# Patient Record
Sex: FEMALE | Race: BLACK OR AFRICAN AMERICAN | NOT HISPANIC OR LATINO | URBAN - METROPOLITAN AREA
[De-identification: names, ages, dates, MRNs, and addresses within clinical notes are randomized per-mention and may not be internally consistent; named-entity substitution may affect disease eponyms.]

---

## 2019-06-18 ENCOUNTER — OFFICE VISIT (OUTPATIENT)
Dept: FAMILY MEDICINE | Facility: CLINIC | Age: 59
End: 2019-06-18
Payer: COMMERCIAL

## 2019-06-18 VITALS
WEIGHT: 208 LBS | DIASTOLIC BLOOD PRESSURE: 76 MMHG | SYSTOLIC BLOOD PRESSURE: 112 MMHG | RESPIRATION RATE: 18 BRPM | BODY MASS INDEX: 36.86 KG/M2 | HEART RATE: 80 BPM | HEIGHT: 63 IN

## 2019-06-18 DIAGNOSIS — G47.00 INSOMNIA, UNSPECIFIED TYPE: Primary | ICD-10-CM

## 2019-06-18 DIAGNOSIS — M77.8 RIGHT SHOULDER TENDONITIS: ICD-10-CM

## 2019-06-18 DIAGNOSIS — Z51.81 MEDICATION MONITORING ENCOUNTER: ICD-10-CM

## 2019-06-18 DIAGNOSIS — Z98.84 BARIATRIC SURGERY STATUS: ICD-10-CM

## 2019-06-18 DIAGNOSIS — R29.898 RIGHT ARM WEAKNESS: ICD-10-CM

## 2019-06-18 DIAGNOSIS — Z12.31 VISIT FOR SCREENING MAMMOGRAM: ICD-10-CM

## 2019-06-18 DIAGNOSIS — E61.1 IRON DEFICIENCY: ICD-10-CM

## 2019-06-18 DIAGNOSIS — E53.8 B12 DEFICIENCY: ICD-10-CM

## 2019-06-18 PROCEDURE — 99999 PR PBB SHADOW E&M-NEW PATIENT-LVL IV: ICD-10-PCS | Mod: PBBFAC,,, | Performed by: FAMILY MEDICINE

## 2019-06-18 PROCEDURE — 99999 PR PBB SHADOW E&M-NEW PATIENT-LVL IV: CPT | Mod: PBBFAC,,, | Performed by: FAMILY MEDICINE

## 2019-06-18 PROCEDURE — 99204 OFFICE O/P NEW MOD 45 MIN: CPT | Mod: S$GLB,,, | Performed by: FAMILY MEDICINE

## 2019-06-18 PROCEDURE — 3008F BODY MASS INDEX DOCD: CPT | Mod: CPTII,S$GLB,, | Performed by: FAMILY MEDICINE

## 2019-06-18 PROCEDURE — 3008F PR BODY MASS INDEX (BMI) DOCUMENTED: ICD-10-PCS | Mod: CPTII,S$GLB,, | Performed by: FAMILY MEDICINE

## 2019-06-18 PROCEDURE — 99204 PR OFFICE/OUTPT VISIT, NEW, LEVL IV, 45-59 MIN: ICD-10-PCS | Mod: S$GLB,,, | Performed by: FAMILY MEDICINE

## 2019-06-18 RX ORDER — PANTOPRAZOLE SODIUM 40 MG/1
40 TABLET, DELAYED RELEASE ORAL DAILY
COMMUNITY
End: 2019-06-19 | Stop reason: SDUPTHER

## 2019-06-18 RX ORDER — KETOCONAZOLE 20 MG/G
CREAM TOPICAL DAILY
COMMUNITY
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
## 2019-06-19 RX ORDER — PANTOPRAZOLE SODIUM 40 MG/1
40 TABLET, DELAYED RELEASE ORAL DAILY
Qty: 30 TABLET | Refills: 5 | Status: SHIPPED | OUTPATIENT
Start: 2019-06-19 | End: 2019-06-20

## 2019-06-20 ENCOUNTER — TELEPHONE (OUTPATIENT)
Dept: FAMILY MEDICINE | Facility: CLINIC | Age: 59
End: 2019-06-20

## 2019-06-20 RX ORDER — PANTOPRAZOLE SODIUM 40 MG/1
40 TABLET, DELAYED RELEASE ORAL 2 TIMES DAILY
Qty: 60 TABLET | Refills: 5 | Status: SHIPPED | OUTPATIENT
Start: 2019-06-20

## 2019-06-20 NOTE — TELEPHONE ENCOUNTER
Called for patient, to follow up on referral to physical therapy. Left message for her to call me back.

## 2019-06-20 NOTE — TELEPHONE ENCOUNTER
----- Message from Judi Dykes sent at 2019 12:36 PM CDT -----  Contact: Self  Smita Matias  MRN: [id number]  : 1960  PCP: Deisy Shelton  Home Phone      [phone]  Work Phone      Not on file.  Mobile          [phone]      MESSAGE:   Patient called regarding pantoprazole (PROTONIX) 40 MG tablet. She takes 2 a day, but only 30 pills were called in. Please advise.    [facility] [place] [phone]

## 2019-06-20 NOTE — TELEPHONE ENCOUNTER
Spoke w/ patient, reports she takes Protonix BID instead of QD as reported. Can we re-route this? Thanks

## 2019-06-21 DIAGNOSIS — Z12.11 COLON CANCER SCREENING: ICD-10-CM

## 2019-06-23 ENCOUNTER — LAB VISIT (OUTPATIENT)
Dept: LAB | Facility: HOSPITAL | Age: 59
End: 2019-06-23
Attending: FAMILY MEDICINE
Payer: COMMERCIAL

## 2019-06-23 DIAGNOSIS — E61.1 IRON DEFICIENCY: ICD-10-CM

## 2019-06-23 DIAGNOSIS — E53.8 B12 DEFICIENCY: ICD-10-CM

## 2019-06-23 DIAGNOSIS — Z51.81 MEDICATION MONITORING ENCOUNTER: ICD-10-CM

## 2019-06-23 DIAGNOSIS — Z98.84 BARIATRIC SURGERY STATUS: ICD-10-CM

## 2019-06-23 LAB
ALBUMIN SERPL BCP-MCNC: 3.6 G/DL (ref 3.5–5.2)
ALP SERPL-CCNC: 96 U/L (ref 55–135)
ALT SERPL W/O P-5'-P-CCNC: 12 U/L (ref 10–44)
ANION GAP SERPL CALC-SCNC: 9 MMOL/L (ref 8–16)
AST SERPL-CCNC: 14 U/L (ref 10–40)
BASOPHILS # BLD AUTO: 0.08 K/UL (ref 0–0.2)
BASOPHILS NFR BLD: 1.2 % (ref 0–1.9)
BILIRUB SERPL-MCNC: 0.3 MG/DL (ref 0.1–1)
BUN SERPL-MCNC: 13 MG/DL (ref 6–20)
CALCIUM SERPL-MCNC: 9.5 MG/DL (ref 8.7–10.5)
CHLORIDE SERPL-SCNC: 106 MMOL/L (ref 95–110)
CHOLEST SERPL-MCNC: 172 MG/DL (ref 120–199)
CHOLEST/HDLC SERPL: 2.5 {RATIO} (ref 2–5)
CO2 SERPL-SCNC: 27 MMOL/L (ref 23–29)
CREAT SERPL-MCNC: 0.8 MG/DL (ref 0.5–1.4)
DIFFERENTIAL METHOD: ABNORMAL
EOSINOPHIL # BLD AUTO: 0.2 K/UL (ref 0–0.5)
EOSINOPHIL NFR BLD: 2.2 % (ref 0–8)
ERYTHROCYTE [DISTWIDTH] IN BLOOD BY AUTOMATED COUNT: 16.1 % (ref 11.5–14.5)
EST. GFR  (AFRICAN AMERICAN): >60 ML/MIN/1.73 M^2
EST. GFR  (NON AFRICAN AMERICAN): >60 ML/MIN/1.73 M^2
FERRITIN SERPL-MCNC: 77 NG/ML (ref 20–300)
GLUCOSE SERPL-MCNC: 96 MG/DL (ref 70–110)
HCT VFR BLD AUTO: 38 % (ref 37–48.5)
HDLC SERPL-MCNC: 69 MG/DL (ref 40–75)
HDLC SERPL: 40.1 % (ref 20–50)
HGB BLD-MCNC: 11.8 G/DL (ref 12–16)
IRON SERPL-MCNC: 69 UG/DL (ref 30–160)
LDLC SERPL CALC-MCNC: 91.6 MG/DL (ref 63–159)
LYMPHOCYTES # BLD AUTO: 3.1 K/UL (ref 1–4.8)
LYMPHOCYTES NFR BLD: 46.4 % (ref 18–48)
MCH RBC QN AUTO: 28 PG (ref 27–31)
MCHC RBC AUTO-ENTMCNC: 31.1 G/DL (ref 32–36)
MCV RBC AUTO: 90 FL (ref 82–98)
MONOCYTES # BLD AUTO: 0.5 K/UL (ref 0.3–1)
MONOCYTES NFR BLD: 7.4 % (ref 4–15)
NEUTROPHILS # BLD AUTO: 2.9 K/UL (ref 1.8–7.7)
NEUTROPHILS NFR BLD: 42.8 % (ref 38–73)
NONHDLC SERPL-MCNC: 103 MG/DL
PLATELET # BLD AUTO: 207 K/UL (ref 150–350)
PMV BLD AUTO: 10.6 FL (ref 9.2–12.9)
POTASSIUM SERPL-SCNC: 4.1 MMOL/L (ref 3.5–5.1)
PROT SERPL-MCNC: 6.9 G/DL (ref 6–8.4)
RBC # BLD AUTO: 4.22 M/UL (ref 4–5.4)
SATURATED IRON: 20 % (ref 20–50)
SODIUM SERPL-SCNC: 142 MMOL/L (ref 136–145)
TOTAL IRON BINDING CAPACITY: 352 UG/DL (ref 250–450)
TRANSFERRIN SERPL-MCNC: 238 MG/DL (ref 200–375)
TRIGL SERPL-MCNC: 57 MG/DL (ref 30–150)
VIT B12 SERPL-MCNC: 417 PG/ML (ref 210–950)
WBC # BLD AUTO: 6.73 K/UL (ref 3.9–12.7)

## 2019-06-23 PROCEDURE — 82607 VITAMIN B-12: CPT

## 2019-06-23 PROCEDURE — 82728 ASSAY OF FERRITIN: CPT

## 2019-06-23 PROCEDURE — 85025 COMPLETE CBC W/AUTO DIFF WBC: CPT

## 2019-06-23 PROCEDURE — 80061 LIPID PANEL: CPT

## 2019-06-23 PROCEDURE — 80053 COMPREHEN METABOLIC PANEL: CPT

## 2019-06-23 PROCEDURE — 83540 ASSAY OF IRON: CPT

## 2019-06-23 PROCEDURE — 36415 COLL VENOUS BLD VENIPUNCTURE: CPT

## 2019-06-26 ENCOUNTER — CLINICAL SUPPORT (OUTPATIENT)
Dept: REHABILITATION | Facility: HOSPITAL | Age: 59
End: 2019-06-26
Attending: FAMILY MEDICINE
Payer: COMMERCIAL

## 2019-06-26 DIAGNOSIS — M79.601 PAIN OF RIGHT UPPER EXTREMITY: ICD-10-CM

## 2019-06-26 DIAGNOSIS — M25.60 STIFFNESS IN JOINT: ICD-10-CM

## 2019-06-26 DIAGNOSIS — R53.1 WEAKNESS: ICD-10-CM

## 2019-06-26 PROCEDURE — 97165 OT EVAL LOW COMPLEX 30 MIN: CPT | Mod: PN

## 2019-06-26 PROCEDURE — 97110 THERAPEUTIC EXERCISES: CPT | Mod: PN

## 2019-06-26 NOTE — PATIENT INSTRUCTIONS
Flexibility: Upper Trapezius Stretch        Gently grasp right side of head while reaching behind back with other hand. Tilt head away until a gentle stretch is felt. Hold ____ seconds.  Repeat ____ times per set. Do ____ sets per session. Do ____ sessions per day.     https://Contextool.[employer].us/341     Copyright © [employer]. All rights reserved.

## 2019-06-27 PROBLEM — R53.1 WEAKNESS: Status: ACTIVE | Noted: 2019-06-27

## 2019-06-27 PROBLEM — M79.601 PAIN OF RIGHT UPPER EXTREMITY: Status: ACTIVE | Noted: 2019-06-27

## 2019-06-27 PROBLEM — M25.60 STIFFNESS IN JOINT: Status: ACTIVE | Noted: 2019-06-27
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
## 2019-06-28 ENCOUNTER — TELEPHONE (OUTPATIENT)
Dept: FAMILY MEDICINE | Facility: CLINIC | Age: 59
End: 2019-06-28

## 2019-07-02 RX ORDER — ESZOPICLONE 1 MG/1
1 TABLET, FILM COATED ORAL NIGHTLY
Qty: 30 TABLET | Refills: 0 | Status: SHIPPED | OUTPATIENT
Start: 2019-07-02 | End: 2019-08-01

## 2019-07-15 ENCOUNTER — OFFICE VISIT (OUTPATIENT)
Dept: FAMILY MEDICINE | Facility: CLINIC | Age: 59
End: 2019-07-15
Payer: COMMERCIAL

## 2019-07-15 ENCOUNTER — APPOINTMENT (OUTPATIENT)
Dept: RADIOLOGY | Facility: CLINIC | Age: 59
End: 2019-07-15
Attending: FAMILY MEDICINE
Payer: COMMERCIAL

## 2019-07-15 VITALS
HEART RATE: 84 BPM | BODY MASS INDEX: 37.21 KG/M2 | RESPIRATION RATE: 18 BRPM | DIASTOLIC BLOOD PRESSURE: 74 MMHG | SYSTOLIC BLOOD PRESSURE: 110 MMHG | WEIGHT: 210 LBS | HEIGHT: 63 IN

## 2019-07-15 DIAGNOSIS — M79.671 RIGHT FOOT PAIN: Primary | ICD-10-CM

## 2019-07-15 DIAGNOSIS — M79.671 RIGHT FOOT PAIN: ICD-10-CM

## 2019-07-15 PROCEDURE — 99213 PR OFFICE/OUTPT VISIT, EST, LEVL III, 20-29 MIN: ICD-10-PCS | Mod: S$GLB,,, | Performed by: FAMILY MEDICINE

## 2019-07-15 PROCEDURE — 3008F BODY MASS INDEX DOCD: CPT | Mod: CPTII,S$GLB,, | Performed by: FAMILY MEDICINE

## 2019-07-15 PROCEDURE — 73630 X-RAY EXAM OF FOOT: CPT | Mod: TC,PO,RT

## 2019-07-15 PROCEDURE — 73630 X-RAY EXAM OF FOOT: CPT | Mod: 26,RT,, | Performed by: RADIOLOGY

## 2019-07-15 PROCEDURE — 3008F PR BODY MASS INDEX (BMI) DOCUMENTED: ICD-10-PCS | Mod: CPTII,S$GLB,, | Performed by: FAMILY MEDICINE

## 2019-07-15 PROCEDURE — 99999 PR PBB SHADOW E&M-EST. PATIENT-LVL III: CPT | Mod: PBBFAC,,, | Performed by: FAMILY MEDICINE

## 2019-07-15 PROCEDURE — 99213 OFFICE O/P EST LOW 20 MIN: CPT | Mod: S$GLB,,, | Performed by: FAMILY MEDICINE

## 2019-07-15 PROCEDURE — 99999 PR PBB SHADOW E&M-EST. PATIENT-LVL III: ICD-10-PCS | Mod: PBBFAC,,, | Performed by: FAMILY MEDICINE

## 2019-07-15 PROCEDURE — 73630 XR FOOT COMPLETE 3 VIEW RIGHT: ICD-10-PCS | Mod: 26,RT,, | Performed by: RADIOLOGY

## 2019-07-16 NOTE — PROGRESS NOTES
Subjective:       Patient ID: Smita Matias is a 59 y.o. female.    Chief Complaint: broken toe (right foot 4th toe)    Pt is a 59 y.o. female who presents for evaluation and management of   Encounter Diagnosis   Name Primary?    Right foot pain Yes   .Her 90# dog stepped on her foot     Doing well on current meds. Denies any side effects. Prevention is up to date.  Review of Systems   Musculoskeletal: Positive for gait problem and joint swelling.       Objective:      Physical Exam   Constitutional: She is oriented to person, place, and time. She appears well-developed and well-nourished.   HENT:   Head: Normocephalic and atraumatic.   Right Ear: External ear normal.   Left Ear: External ear normal.   Nose: Nose normal.   Eyes: Pupils are equal, round, and reactive to light. EOM are normal.   Neck: Normal range of motion. Neck supple. No JVD present. No tracheal deviation present. No thyromegaly present.   Cardiovascular: Normal rate.   Pulmonary/Chest: Effort normal. No respiratory distress.   Abdominal: Soft.   Musculoskeletal: Normal range of motion. She exhibits tenderness. She exhibits no edema.   TTP at MT and MTP joint 4th toe   Lymphadenopathy:     She has no cervical adenopathy.   Neurological: She is alert and oriented to person, place, and time.   Skin: Skin is warm and dry. No rash noted. No erythema. No pallor.   Psychiatric: She has a normal mood and affect. Her behavior is normal. Judgment and thought content normal.       Assessment:       1. Right foot pain        Plan:   Smita was seen today for broken toe.    Diagnoses and all orders for this visit:    Right foot pain  -     X-Ray Foot Complete Right; Future      Problem List Items Addressed This Visit     None      Visit Diagnoses     Right foot pain    -  Primary    Relevant Orders    X-Ray Foot Complete Right (Completed)      Xray negative for fracture   Jese tape     RTC if condition acutely worsens or any other concerns, otherwise RTC  as scheduled    No follow-ups on file.

## 2019-07-18 ENCOUNTER — TELEPHONE (OUTPATIENT)
Dept: FAMILY MEDICINE | Facility: CLINIC | Age: 59
End: 2019-07-18

## 2019-07-18 DIAGNOSIS — M79.601 PAIN OF RIGHT UPPER EXTREMITY: Primary | ICD-10-CM

## 2019-07-18 NOTE — TELEPHONE ENCOUNTER
----- Message from Bettie Wilson sent at 2019  8:21 AM CDT -----  Contact: self  Smita Matias  MRN: [id number]  : 1960  PCP: Deisy Shelton  Home Phone      [phone]  Work Phone      Not on file.  Mobile          [phone]      MESSAGE:   Patient would like to get a call back from nurse to discuss a referral she thinks she needs.    [phone]

## 2019-07-24 ENCOUNTER — CLINICAL SUPPORT (OUTPATIENT)
Dept: REHABILITATION | Facility: HOSPITAL | Age: 59
End: 2019-07-24
Attending: FAMILY MEDICINE
Payer: COMMERCIAL

## 2019-07-24 DIAGNOSIS — R53.1 WEAKNESS: ICD-10-CM

## 2019-07-24 DIAGNOSIS — M25.60 STIFFNESS IN JOINT: ICD-10-CM

## 2019-07-24 DIAGNOSIS — M79.601 PAIN OF RIGHT UPPER EXTREMITY: ICD-10-CM

## 2019-07-24 PROCEDURE — 97110 THERAPEUTIC EXERCISES: CPT | Mod: PN

## 2019-07-24 PROCEDURE — 97140 MANUAL THERAPY 1/> REGIONS: CPT | Mod: PN

## 2019-07-24 NOTE — PROGRESS NOTES
"  Occupational Therapy Daily Treatment Note     Date: 7/24/2019  Name: Smita Matias  Clinic Number: [id number]    Therapy Diagnosis:   Encounter Diagnoses   Name Primary?    Pain of right upper extremity     Weakness     Stiffness in joint      Physician: Deisy Shelton MD    Physician Orders: OT evaluate and treat  Medical Diagnosis:   R29.898 (ICD-10-CM) - Right arm weakness   M75.81 (ICD-10-CM) - Right shoulder tendonitis      Evaluation Date: 6/26/2019  Insurance Authorization period Expiration: 12/31/2019  Plan of Care Expiration Period: 8/23/2019  Next MD appointment:None scheduled     Visit # / Visits Authorized: 2/ 50  Time In:510  Time Out:600  Total Billable Time: 50 minutes  MT1 TE3     Precautions: Standard     Subjective     Pt reports: "I still have the popping in my shoulder but I think it's moving better, I go see the hand doctor on Tuesday though."  she was compliant with home exercise program given 6/26/2019  Response to previous treatment:Increased ROM     Functional change: Increased pain in wrist, unable to perform band exercises because of wrist pain.     Pain: 5/10  Location: right shoulder, wrist    Objective     Smita received the following manual therapy techniques for 10 minutes:   -consisting of patient supine for Right shoulder lateral telescoping, upper trapezius soft tissue mobilization, pectoralis lift, subscapularis myofascial release and stretch, PROM with endrange stretching, glenohumeral joint inferior anterior posterior glides grade I-III, gentle shoulder oscillations    Smita received therapeutic exercises for 40 minutes including:  Exercises        PROM (Right) Shoulder Flexion/Abduction/Internal rotation/External Rotation 10x   Supine pectoralis stretch 3/30"   Supine dowel Flexion 0  2/10   Sidelying Abduction 1#  2/10   Sidelying External Rotation 1#  2/10   Sidelying Gator Not today secondary to time   pulleys 2'   Wall slides towel  2/10   Corner pectoralis " "stretch 3/30"   Theraband Lats Green  2/10   Theraband Rows Green  2/15   Theraband Internal Rotation/External Rotation Red  2/15     Range of Motion/Strength:   Shoulder   Right     AROM PROM   Flexion 145(+10) 140(+5)   Extension 55(+15) WNL   Abduction 145(+10) 145(+35)   HorizAdduction 35(+5) WNL   Internal rotation L4(=) 80(+20)   ER at 90° abd 90(+5) 90(=)   ER at 0° abd 90(+10) 90(=)   NT=Not tested    Home Exercises and Education Provided     Education provided:   - Progress towards goals     Written Home Exercises Provided: Patient instructed to cont prior HEP.  Exercises were reviewed and Smita was able to demonstrate them prior to the end of the session.  Smita demonstrated good  understanding of the HEP provided.   .   See EMR under Patient Instructions for exercises provided 6/26/2019       Assessment   Pt participated well today. She has not been to therapy since evaluation due to work conflicts or weather. Despite that she reports compliance with HEP. She demonstrates overall improved ROM in Right shoulder since initial evaluation. She continues to have pain and popping however it's not as frequent or as intense. Pt states she has an appointment with a hand specialist on Tuesday regarding her wrist. She was unable to perfomd band exercises today secondary to wrist pain. She had significant tenderness noted in biceps durng manual therapy however improved after. Pt very motivated.   Smita is progressing well towards her goals and there are no updates to goals at this time. Pt prognosis is Good.     Pt will continue to benefit from skilled outpatient occupational therapy to address the deficits listed in the problem list on initial evaluation provide pt/family education and to maximize pt's level of independence in the home and community environment.     Anticipated barriers to occupational therapy: none    Pt's spiritual, cultural and educational needs considered and pt agreeable to plan of care and " goals.    Goals:  Short Term Goals to be met in 4 weeks: (7/26/2019)  1) Initiate Hep-met, onging  2) Pt will increase Right shoulder AROM by 10 degrees grossly for improved performance with overhead ADL's-met  3) Pt will report 3/10 pain in (Right)shoulder at worst -Not met, progressing  4) Pt will demonstrate increased MMT to 4/5 grossly Right shoulder -Not met, progressing  5) Patient will be able to achieve less than or equal to 35% on FOTO shoulder survey demonstrating overall improved functional ability with upper extremity. -Not met, progressing  6) Patient will increase Right  by 3# for improved performance with functional grasp -Not met, progressing     Long Term Goals to be met by discharge:  1) Independent with HEP -Not met, progressing  2) Pt will demonstrate (Right) shoulder AROM WNL grossly for [place] with ADL's -Not met, progressing  3) Pt will demonstrate (Right) shoulder MMT WNL grossly for [place] with functional activities -Not met, progressing  4) Independent and pain free with ADL's and IADL's -Not met, progressing  5) Patient will be able to achieve less than or equal to 15% on FOTO shoulder survey demonstrating overall improved functional ability with upper extremity.-Not met, progressing     Plan   Continue with OT POC    Updates/Grading for next session: Progress with ROM and strengthening as able.       ELA Brunner

## 2019-07-26 DIAGNOSIS — R52 PAIN: Primary | ICD-10-CM

## 2019-07-29 ENCOUNTER — TELEPHONE (OUTPATIENT)
Dept: ORTHOPEDICS | Facility: CLINIC | Age: 59
End: 2019-07-29

## 2019-07-29 ENCOUNTER — PATIENT OUTREACH (OUTPATIENT)
Dept: ADMINISTRATIVE | Facility: OTHER | Age: 59
End: 2019-07-29

## 2019-07-30 ENCOUNTER — HOSPITAL ENCOUNTER (OUTPATIENT)
Dept: RADIOLOGY | Facility: OTHER | Age: 59
Discharge: HOME | End: 2019-07-30
Attending: ORTHOPAEDIC SURGERY
Payer: COMMERCIAL

## 2019-07-30 ENCOUNTER — OFFICE VISIT (OUTPATIENT)
Dept: ORTHOPEDICS | Facility: CLINIC | Age: 59
End: 2019-07-30
Payer: COMMERCIAL

## 2019-07-30 VITALS
SYSTOLIC BLOOD PRESSURE: 111 MMHG | HEART RATE: 76 BPM | BODY MASS INDEX: 37.2 KG/M2 | HEIGHT: 63 IN | RESPIRATION RATE: 18 BRPM | DIASTOLIC BLOOD PRESSURE: 77 MMHG

## 2019-07-30 DIAGNOSIS — R52 PAIN: ICD-10-CM

## 2019-07-30 DIAGNOSIS — M25.531 RIGHT WRIST PAIN: Primary | ICD-10-CM

## 2019-07-30 PROCEDURE — 73110 X-RAY EXAM OF WRIST: CPT | Mod: 26,RT,, | Performed by: RADIOLOGY

## 2019-07-30 PROCEDURE — 73110 XR WRIST COMPLETE 3 VIEWS RIGHT: ICD-10-PCS | Mod: 26,RT,, | Performed by: RADIOLOGY

## 2019-07-30 PROCEDURE — 73080 X-RAY EXAM OF ELBOW: CPT | Mod: TC,FY,RT

## 2019-07-30 PROCEDURE — 73080 XR ELBOW COMPLETE 3 VIEW RIGHT: ICD-10-PCS | Mod: 26,RT,, | Performed by: RADIOLOGY

## 2019-07-30 PROCEDURE — 73030 X-RAY EXAM OF SHOULDER: CPT | Mod: TC,FY,RT

## 2019-07-30 PROCEDURE — 99204 OFFICE O/P NEW MOD 45 MIN: CPT | Mod: S$GLB,,, | Performed by: ORTHOPAEDIC SURGERY

## 2019-07-30 PROCEDURE — 99999 PR PBB SHADOW E&M-EST. PATIENT-LVL III: ICD-10-PCS | Mod: PBBFAC,,, | Performed by: ORTHOPAEDIC SURGERY

## 2019-07-30 PROCEDURE — 3008F BODY MASS INDEX DOCD: CPT | Mod: CPTII,S$GLB,, | Performed by: ORTHOPAEDIC SURGERY

## 2019-07-30 PROCEDURE — 73110 X-RAY EXAM OF WRIST: CPT | Mod: TC,FY,RT

## 2019-07-30 PROCEDURE — 3008F PR BODY MASS INDEX (BMI) DOCUMENTED: ICD-10-PCS | Mod: CPTII,S$GLB,, | Performed by: ORTHOPAEDIC SURGERY

## 2019-07-30 PROCEDURE — 99999 PR PBB SHADOW E&M-EST. PATIENT-LVL III: CPT | Mod: PBBFAC,,, | Performed by: ORTHOPAEDIC SURGERY

## 2019-07-30 PROCEDURE — 73030 X-RAY EXAM OF SHOULDER: CPT | Mod: 26,RT,, | Performed by: RADIOLOGY

## 2019-07-30 PROCEDURE — 99204 PR OFFICE/OUTPT VISIT, NEW, LEVL IV, 45-59 MIN: ICD-10-PCS | Mod: S$GLB,,, | Performed by: ORTHOPAEDIC SURGERY

## 2019-07-30 PROCEDURE — 73030 XR SHOULDER COMPLETE 2 OR MORE VIEWS RIGHT: ICD-10-PCS | Mod: 26,RT,, | Performed by: RADIOLOGY

## 2019-07-30 PROCEDURE — 73080 X-RAY EXAM OF ELBOW: CPT | Mod: 26,RT,, | Performed by: RADIOLOGY

## 2019-07-30 NOTE — PROGRESS NOTES
I have personally taken the history and examined this patient. I agree with the resident's note as stated above. Pt was treated for [facility] OA underWC- pt had injury October 2017. She was released back to work. She went back to work in October- [place] surgery [place].Was off work 14 months. 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 Pt knew she was about to let go in [place]. 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 Job going well. 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  Pain limits her from peeling vegetables, some activities.  Trouble with walking dogs  Pt has had history of right shoulder surgery from same injury  Pt has done OT, has braces in [place]  Discussed: Conservative treatment, options. Pt does not want injections, she states it does not work for her body. Pt under care with Vickie Stauffer for her shoulder - Vickie referred her to the hand clinic  Will start with the conservative measurements.
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
## 2019-07-30 NOTE — LETTER
July 31, 2019      No Recipients           [facility] [place] [facility]  [address], [address]  [facility] [address]  Phone: [phone]          Patient: Smita Matias   MR Number: [id number]   YOB: 1960   Date of Visit: 7/30/2019       Dear :    Thank you for referring Smita Matias to me for evaluation. Attached you will find relevant portions of my assessment and plan of care.    If you have questions, please do not hesitate to call me. I look forward to following Smita Matias along with you.    Sincerely,    Beatriz Mccord MD    Enclosure  CC:  No Recipients    If you would like to receive this communication electronically, please contact [email or web address] or [phone] to request more information on [facility] access.    For providers and/or their staff who would like to refer a patient to [facility], please contact us through our one-stop-shop provider referral line, Michelle Fan, at [phone].    If you feel you have received this communication in error or would no longer like to receive these types of communications, please e-mail [email or web address]

## 2019-07-31 ENCOUNTER — CLINICAL SUPPORT (OUTPATIENT)
Dept: REHABILITATION | Facility: HOSPITAL | Age: 59
End: 2019-07-31
Attending: FAMILY MEDICINE
Payer: COMMERCIAL

## 2019-07-31 DIAGNOSIS — M25.60 STIFFNESS IN JOINT: ICD-10-CM

## 2019-07-31 DIAGNOSIS — R53.1 WEAKNESS: ICD-10-CM

## 2019-07-31 DIAGNOSIS — M79.601 PAIN OF RIGHT UPPER EXTREMITY: ICD-10-CM

## 2019-07-31 PROCEDURE — 97110 THERAPEUTIC EXERCISES: CPT | Mod: PN

## 2019-07-31 PROCEDURE — 97140 MANUAL THERAPY 1/> REGIONS: CPT | Mod: PN

## 2019-07-31 PROCEDURE — 97018 PARAFFIN BATH THERAPY: CPT | Mod: PN

## 2019-07-31 NOTE — PROGRESS NOTES
"  Occupational Therapy Daily Treatment Note     Date: 7/31/2019  Name: Smita Matias  Clinic Number: [id number]    Therapy Diagnosis:   Encounter Diagnoses   Name Primary?    Pain of right upper extremity     Weakness     Stiffness in joint      Physician: Deisy Shelton MD    Physician Orders: OT evaluate and treat  Medical Diagnosis:   R29.898 (ICD-10-CM) - Right arm weakness   M75.81 (ICD-10-CM) - Right shoulder tendonitis      Evaluation Date: 6/26/2019  Insurance Authorization period Expiration: 12/31/2019  Plan of Care Expiration Period: 8/23/2019  Next MD appointment: 9/10/2019     Visit # / Visits Authorized: 3/ 50  Time In:300  Time Out:400  Total Billable Time: 50 minutes  P1 MT1 TE3     Precautions: Standard     Subjective     Pt reports: "I still have the popping in my shoulder but I think it's moving better, I go see the hand doctor on Tuesday though."  she was compliant with home exercise program given 6/26/2019  Response to previous treatment:Increased shoulder ROM     Functional change: OT made built up /handles for theraband secondary to hand pain so pt now able to perform her shoulder exercises.     Pain: 5/10  Location: right shoulder, wrist    Objective   Pt received moist hot pack to Right shoulder and paraffin to Right hand for pain management and tissue extensibility x 10 minutes.     Smita received the following manual therapy techniques for 10 minutes:   -consisting of patient supine for Right shoulder lateral telescoping, upper trapezius soft tissue mobilization, pectoralis lift, subscapularis myofascial release and stretch, PROM with endrange stretching, glenohumeral joint inferior anterior posterior glides grade I-III, gentle shoulder oscillations    Smita received therapeutic exercises for 40 minutes including:  Exercises        PROM (Right) Shoulder Flexion/Abduction/Internal rotation/External Rotation 10x   Supine pectoralis stretch 3/30"   Supine dowel Flexion 0  2/10 " "  Sidelying Abduction 1#  2/10   Sidelying External Rotation 1#  2/10   Sidelying Gator Not today secondary to time   pulleys 2'   Wall slides towel  2/10   Corner pectoralis stretch 3/30"   Theraband Lats Green  2/10   Theraband Rows Green  2/15   Theraband Internal Rotation/External Rotation Red  2/15     The follow measurements were taken for reassessment and assessment of hand:    Range of Motion/Strength:   Shoulder   Right      AROM PROM MMT   Flexion 145(=) 155(+15) 4/5   Extension 55(=) WNL 5/5   Abduction 130(-15) 155(+10) 4/5   HorizAdduction 30(-5) WNL 4/5   Internal rotation L4(=) 90(+10) 4/5   ER at 90° abd 90(=) 90(=) 4/5   ER at 0° abd 90(=) 90(=) 4/5   (+) Empty Can  (+) Hawkin's     Elbow and wrist ROM WNL    Hand ROM      AROM     Left Right   THUMB MP Flexion/Extension 55/WNL 50/WNL    IP Flexion/Extension 80/WNL 45/WNL    Radial Abduction 50 40    Palmar Abduction 65 50   *All other digit motion WNL  *Pt able to oppose thumb to all fingers without difficulty    Hand Strength     (lbs) Left Right   2 40(-10 from eval) 5(-5 from eval)     Pinch Left Right   Lateral 12(+1 from eval) 7(+3 from eval)   *Tip (2 point) 7 4   *3 jaw aureliano 12 5     *Not taken at initial evaluation    Home Exercises and Education Provided     Education provided:   - Progress towards goals     Written Home Exercises Provided: Patient instructed to cont prior HEP.  Exercises were reviewed and Smita was able to demonstrate them prior to the end of the session.  Smita demonstrated good  understanding of the HEP provided.     See EMR under Patient Instructions for exercises provided 6/26/2019     Assessment   Pt to clinic today after follow up with Dr. Rain in regards to her hand and wrist pain with orders to assess  strength, pinch strength and overall pre-operative functional status.  and lateral pinch assessed at initial evaluation. Hand ROM grossly assessed at initial evaluation and no significant deficits " noted.  Pt fully reassessed again today. She is demonstrating overall improved ROM and strength in Right shoulder since initial evaluation. She does still have signs and symptoms of possible RTC involvement as well as impingement.  Her pain has also improved in the shoulder however is worse with certain motions. Her Right  has decreased by 5# and is more painful than at initial evaluation. Her pinch has slightly improved however is still painful. She has decreased thumb motion on the right side however all other motions in digits, wrist and and elbow are WNL.     Smita is progressing well towards her goals and there are no updates to goals at this time. Pt prognosis is Good.     Pt will continue to benefit from skilled outpatient occupational therapy to address the deficits listed in the problem list on initial evaluation provide pt/family education and to maximize pt's level of independence in the home and community environment.     Anticipated barriers to occupational therapy: none    Pt's spiritual, cultural and educational needs considered and pt agreeable to plan of care and goals.    Goals:  Short Term Goals to be met in 4 weeks: (7/26/2019)  1) Initiate Hep-met, onging  2) Pt will increase Right shoulder AROM by 10 degrees grossly for improved performance with overhead ADL's-met  3) Pt will report 3/10 pain in (Right)shoulder at worst -Not met, progressing  4) Pt will demonstrate increased MMT to 4/5 grossly Right shoulder -met  5) Patient will be able to achieve less than or equal to 35% on FOTO shoulder survey demonstrating overall improved functional ability with upper extremity. -Not met, progressing  6) Patient will increase Right  by 3# for improved performance with functional grasp -Not met, progressing     Long Term Goals to be met by discharge:  1) Independent with HEP -Not met, progressing  2) Pt will demonstrate (Right) shoulder AROM WNL grossly for [place] with ADL's -Not met,  progressing  3) Pt will demonstrate (Right) shoulder MMT WNL grossly for [place] with functional activities -Not met, progressing  4) Independent and pain free with ADL's and IADL's -Not met, progressing  5) Patient will be able to achieve less than or equal to 15% on FOTO shoulder survey demonstrating overall improved functional ability with upper extremity.-Not met, progressing     Plan   Continue with OT POC    Updates/Grading for next session: Progress with ROM and strengthening as able. Pt to follow up with hand surgeon in 6 weeks.       ELA Brunner

## 2019-08-01 ENCOUNTER — DOCUMENTATION ONLY (OUTPATIENT)
Dept: REHABILITATION | Facility: HOSPITAL | Age: 59
End: 2019-08-01

## 2019-08-01 NOTE — PROGRESS NOTES
OBJECTIVE MEASUREMENTS:       Elbow and wrist ROM WNL     Hand ROM               AROM       Left Right   THUMB MP Flexion/Extension 55/WNL 50/WNL     IP Flexion/Extension 80/WNL 45/WNL     Radial Abduction 50 40     Palmar Abduction 65 50   *All other digit motion WNL  *Pt able to oppose thumb to all fingers without difficulty     Hand Strength      (lbs) Left Right   Rung 2 40(-10 from eval) 5(-5 from eval)     Pinch Left Right   Lateral 12(+1 from eval) 7(+3 from eval)   *Tip (2 point) 7 4   *3 jaw aureliano 12 5      *Not taken at initial evaluation

## 2019-08-06 NOTE — PROGRESS NOTES
"  Occupational Therapy Daily Treatment Note     Date: 8/7/2019  Name: Smita Matias  Clinic Number: [id number]    Therapy Diagnosis:   Encounter Diagnoses   Name Primary?    Pain of right upper extremity     Weakness     Stiffness in joint      Physician: Deisy Shelton MD    Physician Orders: OT evaluate and treat  Medical Diagnosis:   R29.898 (ICD-10-CM) - Right arm weakness   M75.81 (ICD-10-CM) - Right shoulder tendonitis      Evaluation Date: 6/26/2019  Insurance Authorization period Expiration: 12/31/2019  Plan of Care Expiration Period: 8/23/2019  Next MD appointment: 9/10/2019     Visit # / Visits Authorized: 4/ 50  Time In: 245  Time Out:335  Total Billable Time: 40 minutes  P1 MT1 TE2     Precautions: Standard     Subjective     Pt reports: "I feel really good today, I didn't do much typing though."    she was compliant with home exercise program given 6/26/2019  Response to previous treatment:decreased pain    Functional change: Independent with HEP    Pain: 0/10  Location: right shoulder, wrist    Objective   Pt received moist hot pack to Right shoulder and paraffin to Right hand for pain management and tissue extensibility x 10 minutes.     Smita received the following manual therapy techniques for 10 minutes:   -consisting of patient supine for Right shoulder lateral telescoping, upper trapezius soft tissue mobilization, pectoralis lift, subscapularis myofascial release and stretch, PROM with endrange stretching, glenohumeral joint inferior anterior posterior glides grade I-III, gentle shoulder oscillations    Smita received therapeutic exercises for 30 minutes including:  Exercises        PROM (Right) Shoulder Flexion/Abduction/Internal rotation/External Rotation 10x   Supine pectoralis stretch 3/30"   Supine dowel Flexion 2#  2/10   Sidelying Abduction 1#  2/15   Sidelying External Rotation 1#  2/15   Sidelying Gator 1#  2/10   pulleys 3'   Wall slides towel  2/10   Corner pectoralis stretch " "3/30"   Theraband Lats Green  2/10   Theraband Rows Green  2/15   Theraband Internal Rotation/External Rotation Red  2/15     Home Exercises and Education Provided     Education provided:   - Progress towards goals     Written Home Exercises Provided: Patient instructed to cont prior HEP.  Exercises were reviewed and Smita was able to demonstrate them prior to the end of the session.  Smita demonstrated good  understanding of the HEP provided.     See EMR under Patient Instructions for exercises provided 6/26/2019     Assessment   Pt participated well this date. She is pain free today. Able to increase weight with exercises without c/o. Minimal clicking noted in shoulder however improved when pt cued to correct posture. Soft tissue restrictions improving. Pt motivated.   Smita is progressing well towards her goals and there are no updates to goals at this time. Pt prognosis is Good.     Pt will continue to benefit from skilled outpatient occupational therapy to address the deficits listed in the problem list on initial evaluation provide pt/family education and to maximize pt's level of independence in the home and community environment.     Anticipated barriers to occupational therapy: none    Pt's spiritual, cultural and educational needs considered and pt agreeable to plan of care and goals.    Goals:  Short Term Goals to be met in 4 weeks: (7/26/2019)  1) Initiate Hep-met, onging  2) Pt will increase Right shoulder AROM by 10 degrees grossly for improved performance with overhead ADL's-met  3) Pt will report 3/10 pain in (Right)shoulder at worst -Not met, progressing  4) Pt will demonstrate increased MMT to 4/5 grossly Right shoulder -met  5) Patient will be able to achieve less than or equal to 35% on FOTO shoulder survey demonstrating overall improved functional ability with upper extremity. -Not met, progressing  6) Patient will increase Right  by 3# for improved performance with functional grasp -Not " met, progressing     Long Term Goals to be met by discharge:  1) Independent with HEP -Not met, progressing  2) Pt will demonstrate (Right) shoulder AROM WNL grossly for [place] with ADL's -Not met, progressing  3) Pt will demonstrate (Right) shoulder MMT WNL grossly for [place] with functional activities -Not met, progressing  4) Independent and pain free with ADL's and IADL's -Not met, progressing  5) Patient will be able to achieve less than or equal to 15% on FOTO shoulder survey demonstrating overall improved functional ability with upper extremity.-Not met, progressing     Plan   Continue with OT POC    Updates/Grading for next session: Progress with ROM and strengthening as able. Pt to follow up with hand surgeon in 6 weeks.       ELA Brunner

## 2019-08-07 ENCOUNTER — CLINICAL SUPPORT (OUTPATIENT)
Dept: REHABILITATION | Facility: HOSPITAL | Age: 59
End: 2019-08-07
Attending: FAMILY MEDICINE
Payer: COMMERCIAL

## 2019-08-07 DIAGNOSIS — R53.1 WEAKNESS: ICD-10-CM

## 2019-08-07 DIAGNOSIS — M25.60 STIFFNESS IN JOINT: ICD-10-CM

## 2019-08-07 DIAGNOSIS — M79.601 PAIN OF RIGHT UPPER EXTREMITY: ICD-10-CM

## 2019-08-07 PROCEDURE — 97140 MANUAL THERAPY 1/> REGIONS: CPT | Mod: PN

## 2019-08-07 PROCEDURE — 97110 THERAPEUTIC EXERCISES: CPT | Mod: PN

## 2019-08-07 PROCEDURE — 97018 PARAFFIN BATH THERAPY: CPT | Mod: PN,59

## 2019-08-13 NOTE — PROGRESS NOTES
"  Occupational Therapy Daily Treatment Note     Date: 8/14/2019  Name: Smita Matias  Clinic Number: [id number]    Therapy Diagnosis:   Encounter Diagnoses   Name Primary?    Pain of right upper extremity     Weakness     Stiffness in joint      Physician: Deisy Shelton MD    Physician Orders: OT evaluate and treat  Medical Diagnosis:   R29.898 (ICD-10-CM) - Right arm weakness   M75.81 (ICD-10-CM) - Right shoulder tendonitis      Evaluation Date: 6/26/2019  Insurance Authorization period Expiration: 12/31/2019  Plan of Care Expiration Period: 8/23/2019  Next MD appointment: 9/10/2019     Visit # / Visits Authorized: 5/ 50 FOTO:47%  Time In: 205  Time Out:300  Total Billable Time: 45 minutes  P1 MT1 TE2     Precautions: Standard     Subjective     Pt reports: "My shoulder doesn't hurt constantly anymore it's only when I vacuum or use it too much." "My hand hurts when I type a lot."    she was compliant with home exercise program given 6/26/2019  Response to previous treatment:decreased pain    Functional change: Independent with HEP    Pain: 0/10  Location: right shoulder, wrist    Objective   Pt received moist hot pack to Right shoulder and paraffin to Right hand for pain management and tissue extensibility x 10 minutes.     Smita received the following manual therapy techniques for 10 minutes:   -consisting of patient supine for Right shoulder lateral telescoping, upper trapezius soft tissue mobilization, pectoralis lift, subscapularis myofascial release and stretch, PROM with endrange stretching, glenohumeral joint inferior anterior posterior glides grade I-III, gentle shoulder oscillations    Smita received therapeutic exercises for 35 minutes including:  Exercises        PROM (Right) Shoulder Flexion/Abduction/Internal rotation/External Rotation 10x   Supine pectoralis stretch 3/30"   Supine dowel Flexion 3#  2/10   Sidelying Abduction 2#  2/10   Sidelying External Rotation 2#  2/10   Sidelying Gator " "1#  2/10   pulleys 3'   Wall slides towel  2/10   Corner pectoralis stretch 3/30"   Theraband Lats Green  2/10   Theraband Rows Green  2/15   Theraband Internal Rotation/External Rotation Red  2/15     Home Exercises and Education Provided     Education provided:   - Progress towards goals     Written Home Exercises Provided: Patient instructed to cont prior HEP.  Exercises were reviewed and Smita was able to demonstrate them prior to the end of the session.  Smita demonstrated good  understanding of the HEP provided.     See EMR under Patient Instructions for exercises provided 6/26/2019     Assessment   Pt participated well this date. She continues to have low to no pain report in session. Some discomfort with gripping band with shoulder exercises.  Able to increase weight with exercises without c/o.  Soft tissue restrictions improving. Pt motivated. Reporting increased functional use. Pt with slight increase in FOTO score.   Smita is progressing well towards her goals and there are no updates to goals at this time. Pt prognosis is Good.     Pt will continue to benefit from skilled outpatient occupational therapy to address the deficits listed in the problem list on initial evaluation provide pt/family education and to maximize pt's level of independence in the home and community environment.     Anticipated barriers to occupational therapy: none    Pt's spiritual, cultural and educational needs considered and pt agreeable to plan of care and goals.    Goals:  Short Term Goals to be met in 4 weeks: (7/26/2019)  1) Initiate Hep-met, onging  2) Pt will increase Right shoulder AROM by 10 degrees grossly for improved performance with overhead ADL's-met  3) Pt will report 3/10 pain in (Right)shoulder at worst -Not met, progressing  4) Pt will demonstrate increased MMT to 4/5 grossly Right shoulder -met  5) Patient will be able to achieve less than or equal to 35% on FOTO shoulder survey demonstrating overall improved " functional ability with upper extremity. -Not met, progressing  6) Patient will increase Right  by 3# for improved performance with functional grasp -Not met, progressing     Long Term Goals to be met by discharge:  1) Independent with HEP -Not met, progressing  2) Pt will demonstrate (Right) shoulder AROM WNL grossly for [place] with ADL's -Not met, progressing  3) Pt will demonstrate (Right) shoulder MMT WNL grossly for [place] with functional activities -Not met, progressing  4) Independent and pain free with ADL's and IADL's -Not met, progressing  5) Patient will be able to achieve less than or equal to 15% on FOTO shoulder survey demonstrating overall improved functional ability with upper extremity.-Not met, progressing     Plan   Continue with OT POC    Updates/Grading for next session: Progress with ROM and strengthening as able. reasses next session and discuss continued visits for shoulder versus d/c to HEP. Pt to follow up with hand surgeon on 9/10/2019      ELA Brunner

## 2019-08-14 ENCOUNTER — CLINICAL SUPPORT (OUTPATIENT)
Dept: REHABILITATION | Facility: HOSPITAL | Age: 59
End: 2019-08-14
Attending: FAMILY MEDICINE
Payer: COMMERCIAL

## 2019-08-14 DIAGNOSIS — M25.60 STIFFNESS IN JOINT: ICD-10-CM

## 2019-08-14 DIAGNOSIS — R53.1 WEAKNESS: ICD-10-CM

## 2019-08-14 DIAGNOSIS — M79.601 PAIN OF RIGHT UPPER EXTREMITY: ICD-10-CM

## 2019-08-14 PROCEDURE — 97110 THERAPEUTIC EXERCISES: CPT | Mod: PN

## 2019-08-14 PROCEDURE — 97018 PARAFFIN BATH THERAPY: CPT | Mod: PN

## 2019-08-14 PROCEDURE — 97140 MANUAL THERAPY 1/> REGIONS: CPT | Mod: PN

## 2019-08-21 ENCOUNTER — CLINICAL SUPPORT (OUTPATIENT)
Dept: REHABILITATION | Facility: HOSPITAL | Age: 59
End: 2019-08-21
Attending: FAMILY MEDICINE
Payer: COMMERCIAL

## 2019-08-21 DIAGNOSIS — M25.60 STIFFNESS IN JOINT: ICD-10-CM

## 2019-08-21 DIAGNOSIS — M79.601 PAIN OF RIGHT UPPER EXTREMITY: ICD-10-CM

## 2019-08-21 DIAGNOSIS — R53.1 WEAKNESS: ICD-10-CM

## 2019-08-21 PROCEDURE — 97018 PARAFFIN BATH THERAPY: CPT | Mod: PN,59

## 2019-08-21 PROCEDURE — 97110 THERAPEUTIC EXERCISES: CPT | Mod: PN

## 2019-08-21 PROCEDURE — 97140 MANUAL THERAPY 1/> REGIONS: CPT | Mod: PN

## 2019-08-21 NOTE — PROGRESS NOTES
"  Occupational Therapy Daily Treatment Note     Date: 8/21/2019  Name: Smita Matias  Clinic Number: [id number]    Therapy Diagnosis:   Encounter Diagnoses   Name Primary?    Pain of right upper extremity     Weakness     Stiffness in joint      Physician: Deisy Shelton MD    Physician Orders: OT evaluate and treat  Medical Diagnosis:   R29.898 (ICD-10-CM) - Right arm weakness   M75.81 (ICD-10-CM) - Right shoulder tendonitis      Evaluation Date: 6/26/2019  Insurance Authorization period Expiration: 12/31/2019  Plan of Care Expiration Period: 8/23/2019  Next MD appointment: 9/10/2019     Visit # / Visits Authorized: 5/ 50 FOTO:47%  Time In: 300  Time Out:400  Total Billable Time: 50 minutes  P1 MT1 TE3     Precautions: Standard     Subjective     Pt reports: "I'm mostly pain free in my shoulder, I think I can try exercises on my own for now."    she was compliant with home exercise program given 6/26/2019  Response to previous treatment:decreased pain    Functional change: Independent with HEP    Pain: 0/10  Location: right shoulder, wrist    Objective   Pt received moist hot pack to Right shoulder and paraffin to Right hand for pain management and tissue extensibility x 10 minutes.     Smita received the following manual therapy techniques for 10 minutes:   -consisting of patient supine for Right shoulder lateral telescoping, upper trapezius soft tissue mobilization, pectoralis lift, subscapularis myofascial release and stretch, PROM with endrange stretching, glenohumeral joint inferior anterior posterior glides grade I-III, gentle shoulder oscillations    Smita received therapeutic exercises for 40 minutes including:  Exercises        PROM (Right) Shoulder Flexion/Abduction/Internal rotation/External Rotation 10x   Supine pectoralis stretch 3/30"   Supine dowel Flexion 3#  2/15   Sidelying Abduction 2#  2/15   Sidelying External Rotation 2#  2/15   Sidelying Gator 1#  2/15   pulleys 3'   Wall slides " "ball  2/15   Corner pectoralis stretch 3/30"   Theraband Lats Green  2/10   Theraband Rows Green  2/15   Theraband Internal Rotation/External Rotation Red  2/15      Range of Motion/Strength:   Shoulder   Right       AROM PROM MMT   Flexion 150(+5) 169(+5) 5/5   Extension 65(+10) WNL 5/5   Abduction 145(+10) 155(=) 5/5   HorizAdduction 35(+5) WNL 5/5   Internal rotation L4(=) 90(=) 5/5   ER at 90° abd 90(=) 90(=) 5/5   ER at 0° abd 90(=) 90(=) 5/5     Home Exercises and Education Provided     Education provided:   - Progress towards goals     Written Home Exercises Provided: Patient instructed to cont prior HEP.  Exercises were reviewed and Smita was able to demonstrate them prior to the end of the session.  Smita demonstrated good  understanding of the HEP provided.     See EMR under Patient Instructions for exercises provided 6/26/2019     Assessment   Pt has attended OT x 5 visits/2months for treatment Shoulder and assessment of hand pain. Pt has been very motivated and pleasant throughout course of care. She is demonstrating overall improved ROM and strength in Right shoulder and decreased pain. She is Independent and pain free with exercises and most daily tasks. She is demonstrating overall improved FOTO score indicating increased functional use with self care tasks in Right UE. She is to follow up with the hand surgeon on 9/10 regarding mellisa hand pain and decide a course of action after that. She has met all Shoulder goals. Except for FOTO goals.  Smita is progressing well towards her goals and there are no updates to goals at this time. Pt prognosis is Good.     Pt will continue to benefit from skilled outpatient occupational therapy to address the deficits listed in the problem list on initial evaluation provide pt/family education and to maximize pt's level of independence in the home and community environment.     Anticipated barriers to occupational therapy: none    Pt's spiritual, cultural and " educational needs considered and pt agreeable to plan of care and goals.    Goals:  Short Term Goals to be met in 4 weeks: (7/26/2019)  1) Initiate Hep-met, onging  2) Pt will increase Right shoulder AROM by 10 degrees grossly for improved performance with overhead ADL's-met  3) Pt will report 3/10 pain in (Right)shoulder at worst -met  4) Pt will demonstrate increased MMT to 4/5 grossly Right shoulder -met  5) Patient will be able to achieve less than or equal to 35% on FOTO shoulder survey demonstrating overall improved functional ability with upper extremity. -Not met, progressing  6) Patient will increase Right  by 3# for improved performance with functional grasp -Not met, progressing     Long Term Goals to be met by discharge:  1) Independent with HEP -met  2) Pt will demonstrate (Right) shoulder AROM WNL grossly for [place] with ADL's -met  3) Pt will demonstrate (Right) shoulder MMT WNL grossly for [place] with functional activities -met  4) Independent and pain free with ADL's and IADL's -met  5) Patient will be able to achieve less than or equal to 15% on FOTO shoulder survey demonstrating overall improved functional ability with upper extremity.-Not met, progressing     Plan   Plan to discharge to [facility] for shoulder at this time. Plan to hold chart open until she follows up with hand surgeon on 9/10-continue as indicated by ELA Mccormack

## 2019-09-13 ENCOUNTER — PATIENT OUTREACH (OUTPATIENT)
Dept: ADMINISTRATIVE | Facility: OTHER | Age: 59
End: 2019-09-13

## 2019-09-17 ENCOUNTER — TELEPHONE (OUTPATIENT)
Dept: ORTHOPEDICS | Facility: CLINIC | Age: 59
End: 2019-09-17

## 2019-09-18 ENCOUNTER — OFFICE VISIT (OUTPATIENT)
Dept: ORTHOPEDICS | Facility: CLINIC | Age: 59
End: 2019-09-18
Payer: COMMERCIAL

## 2019-09-18 VITALS
DIASTOLIC BLOOD PRESSURE: 79 MMHG | BODY MASS INDEX: 37.21 KG/M2 | SYSTOLIC BLOOD PRESSURE: 126 MMHG | HEIGHT: 63 IN | WEIGHT: 210 LBS | HEART RATE: 65 BPM

## 2019-09-18 DIAGNOSIS — M18.11 ARTHRITIS OF CARPOMETACARPAL (CMC) JOINT OF RIGHT THUMB: Primary | ICD-10-CM

## 2019-09-18 PROCEDURE — 3008F BODY MASS INDEX DOCD: CPT | Mod: CPTII,S$GLB,, | Performed by: PHYSICIAN ASSISTANT

## 2019-09-18 PROCEDURE — 3008F PR BODY MASS INDEX (BMI) DOCUMENTED: ICD-10-PCS | Mod: CPTII,S$GLB,, | Performed by: PHYSICIAN ASSISTANT

## 2019-09-18 PROCEDURE — 99999 PR PBB SHADOW E&M-EST. PATIENT-LVL III: ICD-10-PCS | Mod: PBBFAC,,, | Performed by: PHYSICIAN ASSISTANT

## 2019-09-18 PROCEDURE — 99213 OFFICE O/P EST LOW 20 MIN: CPT | Mod: S$GLB,,, | Performed by: PHYSICIAN ASSISTANT

## 2019-09-18 PROCEDURE — 99213 PR OFFICE/OUTPT VISIT, EST, LEVL III, 20-29 MIN: ICD-10-PCS | Mod: S$GLB,,, | Performed by: PHYSICIAN ASSISTANT

## 2019-09-18 PROCEDURE — 99999 PR PBB SHADOW E&M-EST. PATIENT-LVL III: CPT | Mod: PBBFAC,,, | Performed by: PHYSICIAN ASSISTANT

## 2019-09-18 NOTE — PROGRESS NOTES
Subjective:      Patient ID: Smita Matias is a 59 y.o. female.    Chief Complaint: Pain of the Right Wrist and Follow-up (6 weeks)      HPI  Smita Matias is a left-hand dominant 59 y.o. female presenting today for follow up of right CMC pain.  She has been attending physical therapy for her right shoulder and has noticed improvement in shoulder pain and function.  Patient has history of CMC arthritis treated in 10/2017, also status post rotator cuff surgery in 5/2017 for workman's comp injury.  Since her last visit she has been losing compound cream as needed, reports some improvement in her pain with use of the compound cream.  She has not tried bracing, she is not interested in steroid injections due to no benefit from prior steroid injections. She is not ready to consider surgery at this time.  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  Pain is improved since she has been dictating notes on her phone to minimize typing.      Review of patient's allergies indicates:   Allergen Reactions    Sulfa (sulfonamide antibiotics) Itching         Current Outpatient Medications   Medication Sig Dispense Refill    diclofenac sodium (PENNSAID) 2 % SoPk Apply 40 mg topically 2 (two) times daily. 112 g 4    ketoconazole (NIZORAL) 2 % cream Apply topically once daily.      pantoprazole (PROTONIX) 40 MG tablet Take 1 tablet (40 mg total) by mouth 2 (two) times daily. 60 tablet 5     No current facility-administered medications for this visit.        Past Medical History:   Diagnosis Date    History of posttraumatic stress disorder (PTSD)        Past Surgical History:   Procedure Laterality Date    BICEPS TENDON REPAIR      HYSTERECTOMY  07/04/1997    KNEE ARTHROPLASTY      4 on the right 2 on the left    ROTATOR CUFF REPAIR           Review of Systems:  Review of Systems   Constitution: Negative for chills and fever.   Skin: Negative for  "rash and suspicious lesions.   Musculoskeletal:        See HPI   Neurological: Negative for dizziness, headaches, light-headedness, numbness and paresthesias.   Psychiatric/Behavioral: Negative for depression. The patient is not nervous/anxious.          OBJECTIVE:     PHYSICAL EXAM:  Height: 5' 3" (160 cm) Weight: 95.3 kg (210 lb)  Vitals:    09/18/19 1352   BP: 126/79   Pulse: 65   Weight: 95.3 kg (210 lb)   Height: 5' 3" (1.6 m)   PainSc: 0-No pain     General    Vitals reviewed.  Constitutional: She is oriented to person, place, and time. She appears well-developed and well-nourished.   HENT:   Head: Normocephalic and atraumatic.   Neck: Normal range of motion.   Cardiovascular: Normal rate.    Pulmonary/Chest: Effort normal. No respiratory distress.   Neurological: She is alert and oriented to person, place, and time.   Psychiatric: She has a normal mood and affect. Her behavior is normal. Judgment and thought content normal.             Musculoskeletal:  No scars or edema appreciated.  She is tender palpation at the right CMC joint, otherwise nontender.  Positive grind test.  Mildly positive Finkelstein's on the right.  Good finger and wrist range of motion bilaterally, mild pain with thumb motion. Neurovascularly intact-good sensation and motor function, good capillary refill, 2+ radial pulses.    RADIOGRAPHS:  Right Wrist XRay, 7/30/19  FINDINGS:  The skeletal structures are intact.  Osteoarthritis is present at the articulations of the trapezium, more severe at the 1st CMC joint with narrowing, sclerosis, and marginal spurs.  The other wrist joint spaces are better preserved.  There may be partial fusion of the lunate and triquetrum on a developmental basis; lunate appears slightly subluxed anteriorly..  No focal soft tissue swelling is seen.      Impression       No acute abnormality.  Osteoarthritis particularly at the base of the thumb.       Comments: I have personally reviewed the imaging and I agree " with the above radiologist's report.    ASSESSMENT/PLAN:   Smita was seen today for follow-up and pain.    Diagnoses and all orders for this visit:    Arthritis of carpometacarpal (CMC) joint of right thumb        - discussed patient's symptoms and physical exam findings, discussed continues compound cream as needed. Discussed starting use of a brace, new CMC arthritis packet provided with brace information included.  - continue activity as tolerated  - continue PT for shoulder motion and pain  - follow-up as needed  - patient is not interested in steroid injection or surgery at this time  - call with questions or concerns    Disclaimer: This note has been generated using voice-recognition software. There may be typographical errors that have been missed during proof-reading.

## 2019-10-05 ENCOUNTER — TELEPHONE (OUTPATIENT)
Dept: FAMILY MEDICINE | Facility: CLINIC | Age: 59
End: 2019-10-05

## 2019-10-05 DIAGNOSIS — M79.603 PAIN OF UPPER EXTREMITY, UNSPECIFIED LATERALITY: Primary | ICD-10-CM

## 2019-10-05 NOTE — TELEPHONE ENCOUNTER
----- Message from Christiano Stapleton LPN sent at 10/2/2019  7:03 PM CDT -----  Contact: Patient      ----- Message -----  From: Pamela Ceballos MA  Sent: 10/2/2019   8:41 AM CDT  To: Christiano Stapleton LPN        ----- Message -----  From: Ronald Zee  Sent: 10/2/2019   8:11 AM CDT  To: Nikita SCHROEDER Staff    Smita Matias  MRN: [id number]  : 1960  PCP: Deisy Shetlon  Home Phone      [phone]  Work Phone      Not on file.  [employer]          [phone]      MESSAGE: finished PT for her shoulder/arm approx 2 wks ago -- starting to give her problems again -- requesting referral back to PT, asks that her hand be included this time     Call Smita @ [phone]    PCP: Nikita

## 2019-10-05 NOTE — TELEPHONE ENCOUNTER
Looked at old referral - it was placed by a hand surgeon at [facility], but I don't know where she wants to go - please get details.  gen referral placed.  mh

## 2019-10-11 ENCOUNTER — TELEPHONE (OUTPATIENT)
Dept: FAMILY MEDICINE | Facility: CLINIC | Age: 59
End: 2019-10-11

## 2019-10-11 DIAGNOSIS — M79.603 PAIN OF UPPER EXTREMITY, UNSPECIFIED LATERALITY: Primary | ICD-10-CM

## 2019-10-11 NOTE — TELEPHONE ENCOUNTER
"Reviewed pt's referral and it is noted that it's for "Pain of upper extremity, unspecified laterality"  The questionnaire on the referral states:  Location: Hand  Shoulder  Elbow     Called the patient and notified her that the right hand should be included as an upper extremity and that the hand, in general, was listed as a treatment location on the referral.    Pt verbally understood and stated that she would contact the Rehab office and notify.  "

## 2019-10-11 NOTE — TELEPHONE ENCOUNTER
----- Message from Ronald Zee sent at 10/11/2019  2:59 PM CDT -----  Contact: patient  Smita Matias  MRN: [id number]  : 1960  PCP: Deisy Shelton  Home Phone      [phone]  Work Phone      Not on file.  Mobile          [phone]      MESSAGE: has referral to Rehab Services -- requesting that referral include her right hand    Call [phone]    PCP: Nikita

## 2019-10-11 NOTE — TELEPHONE ENCOUNTER
Patient states that she would like referral to [facility] in [place]. Referral updated and faxed with fax confirmation.

## 2019-11-21 ENCOUNTER — PATIENT OUTREACH (OUTPATIENT)
Dept: ADMINISTRATIVE | Facility: OTHER | Age: 59
End: 2019-11-21

## 2019-11-21 NOTE — PROGRESS NOTES
Patient returned call in regards to her CRS and informed me that she has relocated to [place] and is seeing her doctor there.

## 2019-11-21 NOTE — PROGRESS NOTES
Attempted to contact pt in reference to fitkit dispensed 9/18/2019. No answer. LVM to return phone call. No future appts scheduled.

## 2019-11-29 ENCOUNTER — PATIENT MESSAGE (OUTPATIENT)
Dept: ADMINISTRATIVE | Facility: OTHER | Age: 59
End: 2019-11-29

## 2019-11-29 ENCOUNTER — PATIENT OUTREACH (OUTPATIENT)
Dept: ADMINISTRATIVE | Facility: OTHER | Age: 59
End: 2019-11-29

## 2020-03-26 ENCOUNTER — DOCUMENTATION ONLY (OUTPATIENT)
Dept: REHABILITATION | Facility: HOSPITAL | Age: 60
End: 2020-03-26

## 2020-03-26 DIAGNOSIS — R53.1 WEAKNESS: ICD-10-CM

## 2020-03-26 DIAGNOSIS — M79.601 PAIN OF RIGHT UPPER EXTREMITY: ICD-10-CM

## 2020-03-26 DIAGNOSIS — M25.60 STIFFNESS IN JOINT: ICD-10-CM

## 2020-03-26 NOTE — PROGRESS NOTES
Outpatient Therapy Discharge Summary     Name: Smita Matias  Clinic Number: [id number]    Therapy Diagnosis:   Encounter Diagnoses   Name Primary?    Pain of right upper extremity     Weakness     Stiffness in joint      Physician: Deisy Shelton MD     Physician Orders: OT evaluate and treat  Medical Diagnosis:   R29.898 (ICD-10-CM) - Right arm weakness   M75.81 (ICD-10-CM) - Right shoulder tendonitis      Evaluation Date: 6/26/2019  Insurance Authorization period Expiration: 12/31/2019  Plan of Care Expiration Period: 8/23/2019  Next MD appointment: 9/10/2019     Visit # / Visits Authorized: 5/ 50 FOTO:47%     Precautions: Standard     Date of Last visit: 8/21/2019  Total Visits Received: 5  Cancelled Visits: -  No Show Visits: -    Assessment    Goals: Short Term Goals to be met in 4 weeks: (7/26/2019)  1) Initiate Hep-met, onging  2) Pt will increase Right shoulder AROM by 10 degrees grossly for improved performance with overhead ADL's-met  3) Pt will report 3/10 pain in (Right)shoulder at worst -met  4) Pt will demonstrate increased MMT to 4/5 grossly Right shoulder -met  5) Patient will be able to achieve less than or equal to 35% on FOTO shoulder survey demonstrating overall improved functional ability with upper extremity. -Not met, progressing  6) Patient will increase Right  by 3# for improved performance with functional grasp -Not met, progressing     Long Term Goals to be met by discharge:  1) Independent with HEP -met  2) Pt will demonstrate (Right) shoulder AROM WNL grossly for [place] with ADL's -met  3) Pt will demonstrate (Right) shoulder MMT WNL grossly for [place] with functional activities -met  4) Independent and pain free with ADL's and IADL's -met  5) Patient will be able to achieve less than or equal to 15% on FOTO shoulder survey demonstrating overall improved functional ability with upper extremity.-Not met, progressing    Discharge reason: Patient has not attended  therapy since 8/21/2019    Plan   This patient is discharged from Occupational Therapy

## 2020-06-23 DIAGNOSIS — Z12.11 COLON CANCER SCREENING: ICD-10-CM

## 2020-08-20 DIAGNOSIS — Z12.39 BREAST CANCER SCREENING: ICD-10-CM

## 2020-10-02 ENCOUNTER — PATIENT OUTREACH (OUTPATIENT)
Dept: ADMINISTRATIVE | Facility: HOSPITAL | Age: 60
End: 2020-10-02

## 2020-10-05 ENCOUNTER — PATIENT MESSAGE (OUTPATIENT)
Dept: ADMINISTRATIVE | Facility: HOSPITAL | Age: 60
End: 2020-10-05

## 2020-11-10 ENCOUNTER — PATIENT OUTREACH (OUTPATIENT)
Dept: ADMINISTRATIVE | Facility: HOSPITAL | Age: 60
End: 2020-11-10

## 2020-12-02 ENCOUNTER — PATIENT MESSAGE (OUTPATIENT)
Dept: ADMINISTRATIVE | Facility: HOSPITAL | Age: 60
End: 2020-12-02

## 2021-01-04 ENCOUNTER — PATIENT MESSAGE (OUTPATIENT)
Dept: ADMINISTRATIVE | Facility: HOSPITAL | Age: 61
End: 2021-01-04

## 2021-04-05 ENCOUNTER — PATIENT MESSAGE (OUTPATIENT)
Dept: ADMINISTRATIVE | Facility: HOSPITAL | Age: 61
End: 2021-04-05

## 2021-05-10 ENCOUNTER — PATIENT MESSAGE (OUTPATIENT)
Dept: RESEARCH | Facility: HOSPITAL | Age: 61
End: 2021-05-10

## 2021-07-06 ENCOUNTER — PATIENT MESSAGE (OUTPATIENT)
Dept: ADMINISTRATIVE | Facility: HOSPITAL | Age: 61
End: 2021-07-06

## 2022-02-10 ENCOUNTER — PATIENT MESSAGE (OUTPATIENT)
Dept: ADMINISTRATIVE | Facility: HOSPITAL | Age: 62
End: 2022-02-10
Payer: COMMERCIAL

## 2022-04-04 ENCOUNTER — PATIENT MESSAGE (OUTPATIENT)
Dept: ADMINISTRATIVE | Facility: HOSPITAL | Age: 62
End: 2022-04-04
Payer: COMMERCIAL